# Patient Record
Sex: MALE | Race: WHITE | NOT HISPANIC OR LATINO | Employment: OTHER | URBAN - METROPOLITAN AREA
[De-identification: names, ages, dates, MRNs, and addresses within clinical notes are randomized per-mention and may not be internally consistent; named-entity substitution may affect disease eponyms.]

---

## 2024-04-08 ENCOUNTER — TELEPHONE (OUTPATIENT)
Dept: GASTROENTEROLOGY | Facility: CLINIC | Age: 89
End: 2024-04-08

## 2024-04-17 ENCOUNTER — OFFICE VISIT (OUTPATIENT)
Dept: GASTROENTEROLOGY | Facility: CLINIC | Age: 89
End: 2024-04-17
Payer: MEDICARE

## 2024-04-17 VITALS
SYSTOLIC BLOOD PRESSURE: 90 MMHG | OXYGEN SATURATION: 98 % | HEIGHT: 74 IN | HEART RATE: 43 BPM | WEIGHT: 215 LBS | TEMPERATURE: 96.5 F | BODY MASS INDEX: 27.59 KG/M2 | DIASTOLIC BLOOD PRESSURE: 55 MMHG

## 2024-04-17 DIAGNOSIS — R14.2 BELCHING: ICD-10-CM

## 2024-04-17 DIAGNOSIS — K57.92 DIVERTICULITIS: ICD-10-CM

## 2024-04-17 DIAGNOSIS — K21.00 GASTROESOPHAGEAL REFLUX DISEASE WITH ESOPHAGITIS, UNSPECIFIED WHETHER HEMORRHAGE: ICD-10-CM

## 2024-04-17 DIAGNOSIS — R19.7 DIARRHEA, UNSPECIFIED TYPE: Primary | ICD-10-CM

## 2024-04-17 DIAGNOSIS — R15.2 FECAL URGENCY: ICD-10-CM

## 2024-04-17 PROCEDURE — 99204 OFFICE O/P NEW MOD 45 MIN: CPT | Performed by: NURSE PRACTITIONER

## 2024-04-17 PROCEDURE — 1160F RVW MEDS BY RX/DR IN RCRD: CPT | Performed by: NURSE PRACTITIONER

## 2024-04-17 PROCEDURE — 1159F MED LIST DOCD IN RCRD: CPT | Performed by: NURSE PRACTITIONER

## 2024-04-17 RX ORDER — RAMIPRIL 2.5 MG/1
CAPSULE ORAL
COMMUNITY
Start: 2024-03-21

## 2024-04-17 RX ORDER — TAMSULOSIN HYDROCHLORIDE 0.4 MG/1
0.4 CAPSULE ORAL
COMMUNITY

## 2024-04-17 RX ORDER — CETIRIZINE HYDROCHLORIDE 10 MG/1
10 TABLET ORAL DAILY
COMMUNITY

## 2024-04-17 RX ORDER — CARVEDILOL 3.12 MG/1
TABLET ORAL
COMMUNITY
Start: 2024-03-21

## 2024-04-17 RX ORDER — DONEPEZIL HYDROCHLORIDE 10 MG/1
TABLET, FILM COATED ORAL
COMMUNITY
Start: 2024-03-21

## 2024-04-17 RX ORDER — ROSUVASTATIN CALCIUM 40 MG/1
TABLET, COATED ORAL
COMMUNITY
Start: 2024-03-21

## 2024-04-17 RX ORDER — DULOXETIN HYDROCHLORIDE 60 MG/1
CAPSULE, DELAYED RELEASE ORAL
COMMUNITY
Start: 2024-03-21

## 2024-04-17 NOTE — PATIENT INSTRUCTIONS
Obtain stool studies to assess for infection.     Recommend starting a daily fiber supplement such as Citrucel, Metamucil, or Benefiber available over-the-counter.     For GERD, follow antireflux precautions.  Recommend avoiding eating within 3 to 4 hours of bedtime.  Avoid foods that can trigger symptoms which may include citrus fruits, spicy foods, tomatoes and red sauces, chocolate, coffee/tea, caffeinated or carbonated beverages, alcohol.     For GERD, okay to continue using Nexium and Gaviscon as needed.    Recommend trial of FD Eladio, available over the counter. Take two capsules twice daily.

## 2024-04-17 NOTE — PROGRESS NOTES
"Chief Complaint   Patient presents with    GI Problem         History of Present Illness  Patient is an 89-year-old male who presents today for evaluation, referred for GERD.  He had a recent ER visit for diverticulitis.    Patient reports prior to ER visit for diverticulitis he developed left lower quadrant pain.  This began acutely.  He was treated with ciprofloxacin and metronidazole, has finished the full course and his pain has fully resolved.  This was his second episode of diverticulitis.    He reports over the last year he has experienced some intermittent fecal urgency.  Generally has a bowel movement twice per day.  He has had issues with fecal incontinence at times.  Denies any blood in the stool.    Over the last 3 weeks he has been experiencing diarrhea.  Reports numerous bowel movements per day.    Reports he has been experiencing some belching and dyspepsia.  This is bothersome to him on a regular basis.  He has a history of GERD and takes Nexium and uses Gaviscon as needed for this.     Result Review :       COMPREHENSIVE METABOLIC PANEL (04/04/2024 10:39)    CBC AND DIFFERENTIAL (04/04/2024 10:39)    CT Abdomen & Pelvis W IV Contrast Without Oral Contrast (04/04/2024 16:21)    IMAGING SCANNED (04/02/2024)    SUMMARY OF CARE - SCAN - SUMMARY OF CARE-[facility]-4/03/24 (04/03/2024)    Referral to Josue Del Valle MD for Gastro-esophageal reflux disease without esophagitis (04/10/2024)    Vital Signs:   BP 90/55   Pulse (!) 43   Temp 96.5 °F (35.8 °C)   Ht 188 cm (74\")   Wt 97.5 kg (215 lb)   SpO2 98%   BMI 27.60 kg/m²     Body mass index is 27.6 kg/m².     Physical Exam  Vitals reviewed.   Constitutional:       General: He is not in acute distress.     Appearance: He is well-developed.   HENT:      Head: Normocephalic and atraumatic.   Pulmonary:      Effort: Pulmonary effort is normal. No respiratory distress.   Abdominal:      General: Abdomen is flat. Bowel sounds are normal. There is no " distension.      Palpations: Abdomen is soft.      Tenderness: There is no abdominal tenderness.   Skin:     General: Skin is dry.      Coloration: Skin is not pale.   Neurological:      Mental Status: He is alert and oriented to person, place, and time.   Psychiatric:         Thought Content: Thought content normal.           Assessment and Plan    Diagnoses and all orders for this visit:    1. Diarrhea, unspecified type (Primary)  -     Clostridioides difficile Toxin, PCR - Stool, Per Rectum  -     Fecal Leukocytes - Stool, Per Rectum  -     Stool Culture (Reference Lab) - Stool, Per Rectum  -     Giardia Antigen - Stool, Per Rectum  -     Ova & Parasite Examination - Stool, Per Rectum    2. Diverticulitis    3. Fecal urgency    4. Belching    5. Gastroesophageal reflux disease with esophagitis, unspecified whether hemorrhage         Discussion  Patient presents today for evaluation with concerns about fecal urgency, dyspepsia, and recent episode of diverticulitis   And has recently developed diarrhea.    Regarding diverticulitis, symptoms resolved with antibiotic therapy.  No further treatment needed at this time.  Due to advanced age, would recommend against follow-up colonoscopy at this time as symptoms have resolved.    For fecal urgency and due to recent episode of diverticulitis, recommended trial of daily fiber supplement to see if that would help normalize bowel habits.    For diarrhea, will obtain stool studies to evaluate for infection.      For GERD and dyspepsia, reinforced dietary modifications to help with reflux.  Recommended continuing Nexium daily and will add an FD guard to see if this would help with symptom management.          Follow Up   Return if symptoms worsen or fail to improve.    Patient Instructions   Obtain stool studies to assess for infection.     Recommend starting a daily fiber supplement such as Citrucel, Metamucil, or Benefiber available over-the-counter.     For GERD, follow  antireflux precautions.  Recommend avoiding eating within 3 to 4 hours of bedtime.  Avoid foods that can trigger symptoms which may include citrus fruits, spicy foods, tomatoes and red sauces, chocolate, coffee/tea, caffeinated or carbonated beverages, alcohol.     For GERD, okay to continue using Nexium and Gaviscon as needed.    Recommend trial of FD Eladio, available over the counter. Take two capsules twice daily.

## 2024-04-26 LAB
BACTERIA SPEC CULT: NORMAL
BACTERIA SPEC CULT: NORMAL
C DIFF TOX GENS STL QL NAA+PROBE: NEGATIVE
CAMPYLOBACTER STL CULT: NORMAL
E COLI SXT STL QL IA: NEGATIVE
G LAMBLIA AG STL QL IA: NEGATIVE
O+P SPEC MICRO: NORMAL
O+P STL CONC: NORMAL
SALM + SHIG STL CULT: NORMAL
WBC STL QL MICRO: NORMAL
WBC STL QL MICRO: NORMAL

## 2024-12-02 ENCOUNTER — TRANSCRIBE ORDERS (OUTPATIENT)
Dept: PHYSICAL MEDICINE AND REHAB | Facility: HOSPITAL | Age: 89
End: 2024-12-02
Payer: MEDICARE

## 2024-12-02 DIAGNOSIS — I69.251 HEMIPLEGIA AND HEMIPARESIS FOLLOWING OTHER NONTRAUMATIC INTRACRANIAL HEMORRHAGE AFFECTING RIGHT DOMINANT SIDE: Primary | ICD-10-CM
